# Patient Record
Sex: MALE | Race: WHITE | NOT HISPANIC OR LATINO | Employment: FULL TIME | URBAN - NONMETROPOLITAN AREA
[De-identification: names, ages, dates, MRNs, and addresses within clinical notes are randomized per-mention and may not be internally consistent; named-entity substitution may affect disease eponyms.]

---

## 2023-04-27 ENCOUNTER — APPOINTMENT (OUTPATIENT)
Dept: PRIMARY CARE | Facility: CLINIC | Age: 58
End: 2023-04-27

## 2023-04-27 PROCEDURE — FAA01 PR FAA1 EXAM CLASS I II III: Performed by: FAMILY MEDICINE

## 2025-06-14 ENCOUNTER — OFFICE VISIT (OUTPATIENT)
Dept: URGENT CARE | Age: 60
End: 2025-06-14
Payer: COMMERCIAL

## 2025-06-14 ENCOUNTER — ANCILLARY PROCEDURE (OUTPATIENT)
Dept: URGENT CARE | Age: 60
End: 2025-06-14
Payer: COMMERCIAL

## 2025-06-14 VITALS
DIASTOLIC BLOOD PRESSURE: 93 MMHG | TEMPERATURE: 100.4 F | HEART RATE: 84 BPM | SYSTOLIC BLOOD PRESSURE: 149 MMHG | OXYGEN SATURATION: 95 % | WEIGHT: 200 LBS | RESPIRATION RATE: 16 BRPM

## 2025-06-14 DIAGNOSIS — Z20.822 CONTACT WITH AND (SUSPECTED) EXPOSURE TO COVID-19: ICD-10-CM

## 2025-06-14 DIAGNOSIS — J18.9 COMMUNITY ACQUIRED PNEUMONIA, UNSPECIFIED LATERALITY: Primary | ICD-10-CM

## 2025-06-14 PROCEDURE — 99070 SPECIAL SUPPLIES PHYS/QHP: CPT | Performed by: PHYSICIAN ASSISTANT

## 2025-06-14 PROCEDURE — 99204 OFFICE O/P NEW MOD 45 MIN: CPT | Performed by: PHYSICIAN ASSISTANT

## 2025-06-14 PROCEDURE — 71046 X-RAY EXAM CHEST 2 VIEWS: CPT | Performed by: PHYSICIAN ASSISTANT

## 2025-06-14 PROCEDURE — 87631 RESP VIRUS 3-5 TARGETS: CPT | Performed by: PHYSICIAN ASSISTANT

## 2025-06-14 RX ORDER — ALLOPURINOL 100 MG/1
100 TABLET ORAL
COMMUNITY

## 2025-06-14 RX ORDER — METOPROLOL SUCCINATE 50 MG/1
50 TABLET, EXTENDED RELEASE ORAL
COMMUNITY
Start: 2024-08-10

## 2025-06-14 RX ORDER — DOXYCYCLINE 100 MG/1
100 CAPSULE ORAL 2 TIMES DAILY
Qty: 20 CAPSULE | Refills: 0 | Status: SHIPPED | OUTPATIENT
Start: 2025-06-14 | End: 2025-06-24

## 2025-06-14 NOTE — PROGRESS NOTES
"Subjective   Patient ID: Chauncey Lee \"Jose" is a 59 y.o. male. They present today with a chief complaint of chest congestion and Fever.    Patient disposition: Home    HISTORY OF PRESENT ILLNESS:    This is an adult male with a history of exercise-induced asthma. He presents for subjective fevers and productive cough with brown sputum for 4 days, worsening. Denies CP, SOB. Denies any UTI/sinus symptoms. Traveled just before onset.    Past Medical History  Allergies as of 06/14/2025    (No Known Allergies)       Prescriptions Prior to Admission[1]     Medical History[2]    Surgical History[3]     reports that he has never smoked. He does not have any smokeless tobacco history on file. He reports that he does not use drugs.    Review of Systems    Negative except as documented in the History of Present Illness.                             Objective    Vitals:    06/14/25 1610   BP: (!) 149/93   Pulse: 84   Resp: 16   Temp: (!) 38 °C (100.4 °F)   SpO2: 95%   Weight: 90.7 kg (200 lb)     No LMP for male patient.      PHYSICAL EXAMINATION:    CONSTITUTIONAL: well-appearing, nontoxic         ENT:  Head and face are unremarkable and atraumatic. Mucous membranes moist.    * Oropharynx nl. Airway patent.    * No uvular deviation. No visible abscess.    * Lymphadenopathy absent.    * TMs nl bl.         LUNGS:  Mild LLL rales. No wheezing, no rhonchi. No increased WOB.    CARDIOVASCULAR:   RRR, no m/r/g. Nl S1/S2.    ABDOMEN:  Nontender including left upper quadrant, nondistended, no acute abdomen.     MUSCULOSKELETAL: No obvious deformities. WILLS with equal strength. Gait normal.    SKIN:   Warm and dry with no rashes.    NEURO:  Normal baseline mental status.    PSYCH: Appropriate mood and affect.         ------------------------------------------         MDM: CAP suspected. Rapid PCR testing negative for common viral pathogens. RML infiltrates suspected on my independent interpretation of XR. Doxycycline Rx to " cover atypical+typical pathogens. Clinically stable for outpatient tx, but will fu at ED PRN if any worsening or any lack of improvement in 48 hours. Will control fever at home with ibu/APAP PRN.        Procedures    Diagnostic study results (if any) were reviewed by Prieto Valles PA-C.    No results found for this visit on 06/14/25.     Assessment/Plan   Allergies, medications, history, and pertinent labs/EKGs/Imaging reviewed by Prieto Valles PA-C.     Orders and Diagnoses  Diagnoses and all orders for this visit:  Chest congestion  -     POCT SPOTFIRE R/ST Panel Mini w/COVID ([facility]) manually resulted      Medical Admin Record      Follow Up Instructions  No follow-ups on file.    Electronically signed by Prieto Valles PA-C  4:50 PM         [1] (Not in a hospital admission)  [2] No past medical history on file.  [3] No past surgical history on file.

## 2025-07-07 ENCOUNTER — OFFICE VISIT (OUTPATIENT)
Dept: URGENT CARE | Age: 60
End: 2025-07-07
Payer: COMMERCIAL

## 2025-07-07 VITALS
TEMPERATURE: 97.9 F | RESPIRATION RATE: 17 BRPM | DIASTOLIC BLOOD PRESSURE: 86 MMHG | SYSTOLIC BLOOD PRESSURE: 129 MMHG | WEIGHT: 195 LBS | OXYGEN SATURATION: 98 % | HEART RATE: 60 BPM

## 2025-07-07 DIAGNOSIS — L25.5 CONTACT DERMATITIS DUE TO PLANT: Primary | ICD-10-CM

## 2025-07-07 PROBLEM — I10 PRIMARY HYPERTENSION: Status: ACTIVE | Noted: 2024-06-19

## 2025-07-07 PROBLEM — J45.990 EXERCISE-INDUCED ASTHMA: Status: ACTIVE | Noted: 2018-11-30

## 2025-07-07 RX ORDER — PREDNISONE 20 MG/1
TABLET ORAL
Qty: 18 TABLET | Refills: 0 | Status: SHIPPED | OUTPATIENT
Start: 2025-07-07 | End: 2025-07-16

## 2025-07-07 RX ORDER — TRIAMCINOLONE ACETONIDE 1 MG/G
CREAM TOPICAL 2 TIMES DAILY
Qty: 80 G | Refills: 0 | Status: SHIPPED | OUTPATIENT
Start: 2025-07-07 | End: 2025-07-21

## 2025-07-07 NOTE — PATIENT INSTRUCTIONS
You have contact dermatitis caused by exposure to a plant. To help reduce the inflammation and itching, you will start a course of oral prednisone (steroid) and use Kenalog cream (a topical steroid) on the affected areas. Take the prescribed oral antihistamines to help with itching and allergic reactions. Avoid scratching the rash and try to keep the skin clean and dry. If symptoms worsen, you develop signs of infection (increased redness, warmth, swelling, or pus), or you have any concerns, please contact the clinic.

## 2025-07-07 NOTE — PROGRESS NOTES
"Subjective   Patient ID: Chauncey Lee \"Jose" is a 60 y.o. male. They present today with a chief complaint of Poison Ivy (Started yesterday, getting worse, eye, arms, legs, itching ).    History of Present Illness  60-year-old male presents with complaint of a red, itchy rash affecting bilateral arms, legs, and the area around his right eye. Symptoms began approximately 2 days ago and have been gradually spreading. He has not taken any medications or tried any treatments for this complaint. Denies fever, chills, pain, drainage, swelling, vision changes, shortness of breath, or known new exposures.        Past Medical History  Allergies as of 07/07/2025    (No Known Allergies)       Prescriptions Prior to Admission[1]     Medical History[2]    Surgical History[3]     reports that he has never smoked. He does not have any smokeless tobacco history on file. He reports that he does not use drugs.    Review of Systems  Review of Systems   All other systems reviewed and are negative.    Skin: Positive for red, itchy rash    Eyes: Denies vision changes, discharge, or photophobia    Constitutional: Denies fever, chills, or fatigue    Respiratory: Denies cough or shortness of breath    GI: Denies nausea, vomiting, or abdominal pain    Neuro: Denies headache, dizziness, or weakness    Allergic/Immune: No known new exposures or allergen contact      Objective    Vitals:    07/07/25 1214   BP: 129/86   Pulse: 60   Resp: 17   Temp: 36.6 °C (97.9 °F)   SpO2: 98%   Weight: 88.5 kg (195 lb)     No LMP for male patient.    Physical Exam  Vitals and nursing note reviewed.       General: Alert, in no acute distress    Skin: Erythematous, pruritic papular rash noted on bilateral arms and legs; periocular erythema and irritation noted around the right eye, without swelling, vesicles, or drainage. No signs of secondary infection    HEENT: Conjunctiva clear, no scleral injection; no periorbital swelling    Lungs: Clear to " auscultation bilaterally    CV: Regular rate and rhythm, no murmurs    Neuro: Alert and oriented x3, no focal deficits  Procedures    Point of Care Test & Imaging Results from this visit  No results found for this visit on 07/07/25.   Imaging  No results found.    Cardiology, Vascular, and Other Imaging  No other imaging results found for the past 2 days      Diagnostic study results (if any) were reviewed by PRATEEK Prado.    Assessment/Plan   Allergies, medications, history, and pertinent labs/EKGs/Imaging reviewed by PRATEEK Prado.     Medical Decision Making  Patient presents with contact dermatitis likely due to plant exposure. Treatment initiated with oral prednisone and topical Kenalog cream to reduce inflammation. Oral antihistamines prescribed for symptomatic relief of itching. Patient advised on skin care and avoidance of scratching. Monitor for any signs of secondary infection or worsening symptoms. Follow-up planned as needed.    Orders and Diagnoses  Diagnoses and all orders for this visit:  Contact dermatitis due to plant  -     predniSONE (Deltasone) 20 mg tablet; Take 3 tablets (60 mg) by mouth once daily for 3 days, THEN 2 tablets (40 mg) once daily for 3 days, THEN 1 tablet (20 mg) once daily for 3 days.  -     triamcinolone (Kenalog) 0.1 % cream; Apply topically 2 times a day for 14 days.      Medical Admin Record      Patient disposition: Home    Electronically signed by PRATEEK Prado  7:48 AM           [1] (Not in a hospital admission)   [2] History reviewed. No pertinent past medical history.  [3] History reviewed. No pertinent surgical history.